# Patient Record
Sex: FEMALE | Race: WHITE | Employment: FULL TIME | URBAN - METROPOLITAN AREA
[De-identification: names, ages, dates, MRNs, and addresses within clinical notes are randomized per-mention and may not be internally consistent; named-entity substitution may affect disease eponyms.]

---

## 2017-01-03 ENCOUNTER — TRANSFERRED RECORDS (OUTPATIENT)
Dept: HEALTH INFORMATION MANAGEMENT | Facility: CLINIC | Age: 22
End: 2017-01-03

## 2017-01-26 ENCOUNTER — TELEPHONE (OUTPATIENT)
Dept: OBGYN | Facility: CLINIC | Age: 22
End: 2017-01-26

## 2017-02-01 ENCOUNTER — OFFICE VISIT (OUTPATIENT)
Dept: OBGYN | Facility: CLINIC | Age: 22
End: 2017-02-01
Payer: COMMERCIAL

## 2017-02-01 VITALS
BODY MASS INDEX: 29.92 KG/M2 | WEIGHT: 179.6 LBS | DIASTOLIC BLOOD PRESSURE: 70 MMHG | HEART RATE: 94 BPM | SYSTOLIC BLOOD PRESSURE: 141 MMHG | TEMPERATURE: 97.9 F | HEIGHT: 65 IN

## 2017-02-01 DIAGNOSIS — Z23 NEED FOR TDAP VACCINATION: ICD-10-CM

## 2017-02-01 DIAGNOSIS — Z30.432 ENCOUNTER FOR IUD REMOVAL: Primary | ICD-10-CM

## 2017-02-01 PROCEDURE — 90471 IMMUNIZATION ADMIN: CPT | Performed by: NURSE PRACTITIONER

## 2017-02-01 PROCEDURE — 58301 REMOVE INTRAUTERINE DEVICE: CPT | Performed by: NURSE PRACTITIONER

## 2017-02-01 PROCEDURE — 90715 TDAP VACCINE 7 YRS/> IM: CPT | Performed by: NURSE PRACTITIONER

## 2017-02-01 ASSESSMENT — PAIN SCALES - GENERAL: PAINLEVEL: NO PAIN (0)

## 2017-02-01 NOTE — MR AVS SNAPSHOT
"              After Visit Summary   2017    Laura Montelongo    MRN: [id number]           Patient Information     Date Of Birth          1995        Visit Information        Provider Department      2017 10:30 AM Liliana Dan APRN CNP [facility]        Today's Diagnoses     Encounter for IUD removal    -  1     Need for Tdap vaccination            Follow-ups after your visit        Who to contact     If you have questions or need follow up information about today's clinic visit or your schedule please contact [facility] directly at [phone].  Normal or non-critical lab and imaging results will be communicated to you by [employer] [employer], letter or phone within 4 business days after the clinic has received the results. If you do not hear from us within 7 days, please contact the clinic through [employer] or phone. If you have a critical or abnormal lab result, we will notify you by phone as soon as possible.  Submit refill requests through [employer] or call your pharmacy and they will forward the refill request to us. Please allow 3 business days for your refill to be completed.          Additional Information About Your Visit        MyChart Information     [employer] lets you send messages to your doctor, view your test results, renew your prescriptions, schedule appointments and more. To sign up, go to [email or web address] . Click on \"Log in\" on the left side of the screen, which will take you to the Welcome page. Then click on \"Sign up Now\" on the right side of the page.     You will be asked to enter the access code listed below, as well as some personal information. Please follow the directions to create your username and password.     Your access code is: [email or web address]  Expires: 3/16/2017 11:24 AM     Your access code will  in 90 days. If you need help or a new code, please call your [facility] or [phone].        Care EveryWhere ID     This is your Care " "EveryWhere ID. This could be used by other organizations to access your [facility] medical records  [id number]        Your Vitals Were     Pulse Temperature Height BMI (Body Mass Index)          94 97.9  F (36.6  C) (Oral) 5' 5.25\" (1.657 m) 29.67 kg/m2         Blood Pressure from Last 3 Encounters:   02/01/17 141/70   12/16/16 119/78   08/08/16 119/75    Weight from Last 3 Encounters:   02/01/17 179 lb 9.6 oz (81.466 kg)   12/16/16 186 lb 3.2 oz (84.46 kg)   08/08/16 180 lb (81.647 kg)              We Performed the Following     REMOVE INTRAUTERINE DEVICE     TDAP (ADACEL AGES 11-64)        Primary Care Provider Office Phone # Fax #    Madhavi Singh [phone]       [address] [address]        Thank you!     Thank you for choosing [facility]  [facility]. Our goal is always to provide you with excellent care. Hearing back from our patients is one way we can continue to improve our services. Please take a few minutes to complete the written survey that you may receive in the mail after your visit with us. Thank you!             Your Updated Medication List - Protect others around you: Learn how to safely use, store and throw away your medicines at www.disposemymeds.org.          This list is accurate as of: 2/1/17 11:43 AM.  Always use your most recent med list.                   Brand Name Dispense Instructions for use    BIOTIN          BROMALINE PO      Take by mouth daily       FOLIC ACID PO      Take by mouth daily       OMEGA-3 FISH OIL PO      Take by mouth daily       VITAMIN A PO      Take by mouth daily       VITAMIN C PO      Take by mouth daily       VITAMIN D3 PO      Take by mouth daily         "

## 2017-02-01 NOTE — NURSING NOTE
"Chief Complaint   Patient presents with     IUD     Removal       Initial /70 mmHg  Pulse 94  Temp(Src) 97.9  F (36.6  C) (Oral)  Ht 5' 5.25\" (1.657 m)  Wt 179 lb 9.6 oz (81.466 kg)  BMI 29.67 kg/m2 Estimated body mass index is 29.67 kg/(m^2) as calculated from the following:    Height as of this encounter: 5' 5.25\" (1.657 m).    Weight as of this encounter: 179 lb 9.6 oz (81.466 kg)..  BP completed using cuff size: regular    Consent obtained today at visit, please see scanned report.       Screening Questionnaire for Adult Immunization    Are you sick today?   Yes   Do you have allergies to medications, food, a vaccine component or latex?   No   Have you ever had a serious reaction after receiving a vaccination?   No   Do you have a long-term health problem with heart disease, lung disease, asthma, kidney disease, metabolic disease (e.g. diabetes), anemia, or other blood disorder?   No   Do you have cancer, leukemia, HIV/AIDS, or any other immune system problem?   No   In the past 3 months, have you taken medications that affect  your immune system, such as prednisone, other steroids, or anticancer drugs; drugs for the treatment of rheumatoid arthritis, Crohn s disease, or psoriasis; or have you had radiation treatments?   No   Have you had a seizure, or a brain or other nervous system problem?   No   During the past year, have you received a transfusion of blood or blood     products, or been given immune (gamma) globulin or antiviral drug?   No   For women: Are you pregnant or is there a chance you could become        pregnant during the next month?   No   Have you received any vaccinations in the past 4 weeks?   No     Immunization questionnaire was positive for at least one answer.  Notified Liliana ACEVEDO CNP.      MNVFC doesn't apply on this patient    Per orders of Liliana ACEVEDO CNP, injection of Tdap given by Mayra Marrero. Patient instructed to remain in clinic for 20 minutes " afterwards, and to report any adverse reaction to me immediately.       Screening performed by Mayra Marrero on 2/1/2017 at 10:38 AM.        Mayra Marrero CMA

## 2017-02-01 NOTE — PROGRESS NOTES
"SUBJECTIVE:  Laura Montelongo is a 21 year old female who presents to the clinic today for an IUD removal. Mirena was inserted in 2015 in [place]. Feels it is causing her IBS symptoms to worsen as well as causing mood changes. Would like it removed and plans to use nothing else at this time. Patient medical, surgical, social, and family history reviewed and updated at today's visit. ROS: 10 point ROS neg other than the symptoms noted above in the HPI.  Would like to update her Tdap today.    OBJECTIVE:  Well appearing female in no acute distress. Answers questions and maintains eye contact appropriately.  Blood pressure 141/70, pulse 94, temperature 97.9  F (36.6  C), temperature source Oral, height 5' 5.25\" (1.657 m), weight 179 lb 9.6 oz (81.466 kg).  PELVIC:    External genitalia: normal without lesion. Normal BUS.  Vagina: normal mucosa and rugae, no discharge.  Cervix: normal without lesion.  Uterus: small, mobile, nontender.  Adnexa: non tender, without masses    ASSESSMENT:  IUD removal.    PLAN:  Removal procedure discussed with patient and she desires to proceed. Consent obtained.  IUD easily removed intact with a ring forceps. Pt shown the intact IUD. Reportable signs and symptoms discussed. Report any fevers or excessive cramps.  Tdap given.    Liliana ACEVEDO CNP        "

## 2017-03-07 ENCOUNTER — OFFICE VISIT (OUTPATIENT)
Dept: PSYCHIATRY | Facility: CLINIC | Age: 22
End: 2017-03-07
Attending: NURSE PRACTITIONER
Payer: COMMERCIAL

## 2017-03-07 VITALS
WEIGHT: 178.8 LBS | BODY MASS INDEX: 29.53 KG/M2 | HEART RATE: 105 BPM | SYSTOLIC BLOOD PRESSURE: 119 MMHG | DIASTOLIC BLOOD PRESSURE: 79 MMHG

## 2017-03-07 DIAGNOSIS — F12.20 CANNABIS DEPENDENCE (H): Primary | ICD-10-CM

## 2017-03-07 PROCEDURE — 99212 OFFICE O/P EST SF 10 MIN: CPT | Mod: ZF
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
## 2017-03-07 NOTE — MR AVS SNAPSHOT
After Visit Summary   3/7/2017    Laura Montelongo    MRN: [id number]           Patient Information     Date Of Birth          1995        Visit Information        Provider Department      3/7/2017 3:30 PM Marifer Ballesteros APRN [facility]        Today's Diagnoses     Cannabis dependence (H)    -  1       Follow-ups after your visit        Follow-up notes from your care team     Return if symptoms worsen or fail to improve.      Who to contact     Please call your clinic at [phone] to:    Ask questions about your health    Make or cancel appointments    Discuss your medicines    Learn about your test results    Speak to your doctor   If you have compliments or concerns about an experience at your clinic, or if you wish to file a complaint, please contact [facility] Patient Relations at [phone] or email us at [email or web address]         Additional Information About Your Visit        MyChart Information     [employer] is an electronic gateway that provides easy, online access to your medical records. With [employer], you can request a clinic appointment, read your test results, renew a prescription or communicate with your care team.     To sign up for [employer] visit the website at [email or web address]   You will be asked to enter the access code listed below, as well as some personal information. Please follow the directions to create your username and password.     Your access code is: [email or web address]  Expires: 3/16/2017 11:24 AM     Your access code will  in 90 days. If you need help or a new code, please contact your [facility] or call [phone] for assistance.        Care EveryWhere ID     This is your Care EveryWhere ID. This could be used by other organizations to access your [place] medical records  [id number]        Your Vitals Were     Pulse BMI (Body Mass Index)                105 29.53 kg/m2            Blood Pressure from Last 3 Encounters:   03/07/17 119/79   02/01/17 141/70   12/16/16 119/78    Weight from Last 3 Encounters:   03/07/17 81.1 kg (178 lb 12.8 oz)   02/01/17 81.5 kg (179 lb 9.6 oz)   12/16/16 84.5 kg (186 lb 3.2 oz)              Today, you had the following     No orders found for display       Primary Care Provider Office Phone # Fax #    Madhavi Singh [phone]       [address] [address]        Thank you!     Thank you for choosing [facility]  for your care. Our goal is always to provide you with excellent care. Hearing back from our patients is one way we can continue to improve our services. Please take a few minutes to complete the written survey that you may receive in the mail after your visit with us. Thank you!             Your Updated Medication List - Protect others around you: Learn how to safely use, store and throw away your medicines at www.disposemymeds.org.          This list is accurate as of: 3/7/17 11:59 PM.  Always use your most recent med list.                   Brand Name Dispense Instructions for use    BIOTIN          BROMALINE PO      Take by mouth daily       FOLIC ACID PO      Take by mouth daily       OMEGA-3 FISH OIL PO      Take by mouth daily       VITAMIN A PO      Take by mouth daily       VITAMIN C PO      Take by mouth daily       VITAMIN D3 PO      Take by mouth daily

## 2017-03-07 NOTE — NURSING NOTE
Chief Complaint   Patient presents with     Eval/Assessment     evaluation     Reviewed allergies, smoking status, and pharmacy preference  Administered abuse screening questions   Obtained weight, blood pressure and heart rate